# Patient Record
Sex: FEMALE | Race: WHITE
[De-identification: names, ages, dates, MRNs, and addresses within clinical notes are randomized per-mention and may not be internally consistent; named-entity substitution may affect disease eponyms.]

---

## 2017-06-12 ENCOUNTER — HOSPITAL ENCOUNTER (EMERGENCY)
Dept: HOSPITAL 45 - C.EDB | Age: 42
LOS: 1 days | Discharge: HOME | End: 2017-06-13
Payer: COMMERCIAL

## 2017-06-12 VITALS
WEIGHT: 148.37 LBS | BODY MASS INDEX: 22.49 KG/M2 | WEIGHT: 148.37 LBS | HEIGHT: 67.99 IN | HEIGHT: 67.99 IN | BODY MASS INDEX: 22.49 KG/M2

## 2017-06-12 VITALS — TEMPERATURE: 98.24 F

## 2017-06-12 DIAGNOSIS — Q44.6: ICD-10-CM

## 2017-06-12 DIAGNOSIS — N39.0: ICD-10-CM

## 2017-06-12 DIAGNOSIS — R10.31: Primary | ICD-10-CM

## 2017-06-12 DIAGNOSIS — Z91.018: ICD-10-CM

## 2017-06-12 NOTE — EMERGENCY ROOM VISIT NOTE
History


Report prepared by Theodora:  Layla Green


Under the Supervision of:  Dr. Pepe Mcintosh M.D.


First contact with patient:  23:50


Chief Complaint:  FLANK PAIN


Stated Complaint:  PAIN IN LWR RIGHT SIDE





History of Present Illness


The patient is a 41 year old female who presents to the Emergency Room with 

complaints of persistent right lower quadrant abdominal pain that started 

around 0800 this morning. She rates this pain a 6/10 in severity. This pain is 

worse with movement and palpation. Associated symptoms include urinary 

frequency. Patient denies fevers, chills, nausea, vomiting, vaginal discharge, 

diarrhea, chest pain, painful urination, or any additional associated symptoms. 

Patient denies a significant GI history.





   Source of History:  patient


   Onset:  0800 tihs morning


   Position:  abdomen (RLQ)


   Symptom Intensity:  6/10


   Timing:  other (Persistent )


   Modifying Factors (Worsening):  movement, other (Palpation)


   Associated Symptoms:  + urinary symptoms (frequency), No fevers, No chills, 

No nausea, No vomiting, No diarrhea





Review of Systems


See HPI for pertinent positives & negatives. A total of 10 systems reviewed and 

were otherwise negative.





Past Medical & Surgical


Medical Problems:


(1) No history of gastrointestinal disorder








Family History





FH: hypertension





Social History


Smoking Status:  Never Smoker


Smokeless Tobacco Use:  No


Alcohol Use:  none


Drug Use:  none


Occupation Status:  employed





Current/Historical Medications


Scheduled


Birth Control Pills (Birth Control Pills), 1 TAB PO DAILY


Cephalexin Monohydrate (Keflex), 500 MG PO TID





Allergies


Coded Allergies:  


     Grape (Verified  Allergy, Unknown, GRAPE FLAVORING, 6/13/17)





Physical Exam


Vital Signs











  Date Time  Temp Pulse Resp B/P (MAP) Pulse Ox O2 Delivery O2 Flow Rate FiO2


 


6/13/17 01:43  80 18 123/83 99   


 


6/13/17 01:26  80 18 123/83 99 Room Air  


 


6/12/17 23:46 36.8 78 18 129/84 100 Room Air  











Physical Exam


GENERAL: Patient is well appearing, in moderate discomfort. 


HEENT: No acute trauma, normocephalic atraumatic, mucous membranes moist, no 

nasal congestion, no scleral icterus.


NECK: No stridor, no adenopathy, no meningismus, trachea is midline.


LUNGS: No dyspnea. Clear to auscultation and equal bilaterally. No wheeze, no 

rhonchi.


HEART: Regular rate and rhythm.  No murmurs, rubs, gallops appreciated.


ABDOMEN: Moderate right lower quadrant tenderness to palpation. no guarding, 

questionable rebound, bowel sounds positive, no masses appreciated, no 

peritonitis.


BACK: No midline tenderness, no CVA tenderness


EXTREMITIES: Normal motion all extremities, no cyanosis, no edema.


NEUROLOGIC: Alert and oriented, no acute motor or sensory deficits, no focal 

weakness, cranial nerves grossly intact.


SKIN: No rash, no jaundice, no diaphoresis.





Medical Decision & Procedures


ER Provider


Diagnostic Interpretation:


CT results per my review and statrad interpretation:





CT ABDOMEN & PELVIS:





Hepatomegaly with innumerable fluid attenuation lesions throughout the liver 

suggesting polycystic liver disease. Gallbladder, spleen, pancreas, and adrenal 

glands are unremarkable. 





No evidence of hydronephrosis or hydroureter. Symmetric renal enhancement. 

Punctate calcification near the right UVJ (2-80), favor phlebolith over distal 

ureteral stone given lack of right hydroureteronephrosis; correlate for 

hematuria. 





No evidence of bowel obstruction. The appendix is not visualized. 





Urinary bladder and uterus are unremarkable.





No acute osseous findings.





Radiologist: Adam Ibarra M.D.





Laboratory Results


6/13/17 00:15








Red Blood Count 4.75, Mean Corpuscular Volume 89.5, Mean Corpuscular Hemoglobin 

28.6, Mean Corpuscular Hemoglobin Concent 32.0, Mean Platelet Volume 9.3, 

Neutrophils (%) (Auto) 44.0, Lymphocytes (%) (Auto) 46.4, Monocytes (%) (Auto) 

6.9, Eosinophils (%) (Auto) 2.2, Basophils (%) (Auto) 0.4, Neutrophils # (Auto) 

4.23, Lymphocytes # (Auto) 4.45, Monocytes # (Auto) 0.66, Eosinophils # (Auto) 

0.21, Basophils # (Auto) 0.04





6/13/17 00:15

















Test


  6/13/17


00:10 6/13/17


00:15 6/13/17


00:21


 


Urine Color YELLOW   


 


Urine Appearance CLEAR (CLEAR)   


 


Urine pH 5.5 (4.5-7.5)   


 


Urine Specific Gravity


  1.008


(1.000-1.030) 


  


 


 


Urine Protein NEG (NEG)   


 


Urine Glucose (UA) NEG (NEG)   


 


Urine Ketones NEG (NEG)   


 


Urine Occult Blood NEG (NEG)   


 


Urine Nitrite NEG (NEG)   


 


Urine Bilirubin NEG (NEG)   


 


Urine Urobilinogen NEG (NEG)   


 


Urine Leukocyte Esterase TRACE (NEG)   


 


Urine WBC (Auto) 1-5 /hpf (0-5)   


 


Urine RBC (Auto) 0-4 /hpf (0-4)   


 


Urine Hyaline Casts (Auto) 0 /lpf (0-5)   


 


Urine Epithelial Cells (Auto) >30 /lpf (0-5)   


 


Urine Bacteria (Auto) 1+ (NEG)   


 


Urine Pregnancy Test NEG (NEG)   


 


White Blood Count


  


  9.60 K/uL


(4.8-10.8) 


 


 


Red Blood Count


  


  4.75 M/uL


(4.2-5.4) 


 


 


Hemoglobin


  


  13.6 g/dL


(12.0-16.0) 


 


 


Hematocrit  42.5 % (37-47)  


 


Mean Corpuscular Volume


  


  89.5 fL


() 


 


 


Mean Corpuscular Hemoglobin


  


  28.6 pg


(25-34) 


 


 


Mean Corpuscular Hemoglobin


Concent 


  32.0 g/dl


(32-36) 


 


 


Platelet Count


  


  328 K/uL


(130-400) 


 


 


Mean Platelet Volume


  


  9.3 fL


(7.4-10.4) 


 


 


Neutrophils (%) (Auto)  44.0 %  


 


Lymphocytes (%) (Auto)  46.4 %  


 


Monocytes (%) (Auto)  6.9 %  


 


Eosinophils (%) (Auto)  2.2 %  


 


Basophils (%) (Auto)  0.4 %  


 


Neutrophils # (Auto)


  


  4.23 K/uL


(1.4-6.5) 


 


 


Lymphocytes # (Auto)


  


  4.45 K/uL


(1.2-3.4) 


 


 


Monocytes # (Auto)


  


  0.66 K/uL


(0.11-0.59) 


 


 


Eosinophils # (Auto)


  


  0.21 K/uL


(0-0.5) 


 


 


Basophils # (Auto)


  


  0.04 K/uL


(0-0.2) 


 


 


RDW Standard Deviation


  


  49.1 fL


(36.4-46.3) 


 


 


RDW Coefficient of Variation


  


  14.9 %


(11.5-14.5) 


 


 


Immature Granulocyte % (Auto)  0.1 %  


 


Immature Granulocyte # (Auto)


  


  0.01 K/uL


(0.00-0.02) 


 


 


Est Creatinine Clear Calc


Drug Dose 


  94.3 ml/min 


  


 


 


Estimated GFR (


American) 


  106.9 


  


 


 


Estimated GFR (Non-


American 


  92.2 


  


 


 


BUN/Creatinine Ratio  16.5 (10-20)  


 


Calcium Level


  


  9.5 mg/dl


(8.5-10.1) 


 


 


Total Bilirubin


  


  0.6 mg/dl


(0.2-1) 


 


 


Direct Bilirubin


  


  0.1 mg/dl


(0-0.2) 


 


 


Aspartate Amino Transf


(AST/SGOT) 


  19 U/L (15-37) 


  


 


 


Alanine Aminotransferase


(ALT/SGPT) 


  28 U/L (12-78) 


  


 


 


Alkaline Phosphatase


  


  66 U/L


() 


 


 


Total Protein


  


  8.8 gm/dl


(6.4-8.2) 


 


 


Albumin


  


  4.3 gm/dl


(3.4-5.0) 


 


 


Lipase


  


  196 U/L


() 


 


 


Bedside Hemoglobin


  


  


  15.6 g/dl


(12.0-16.0)


 


Bedside Hematocrit   46 % (37-47) 


 


Bedside Sodium


  


  


  142 mEq/L


(135-144)


 


Bedside Potassium


  


  


  3.7 mEq/L


(3.3-5.0)


 


Bedside Chloride


  


  


  100 mEq/L


(101-112)


 


Bedside Total CO2


  


  


  27 mEq/l


(24-31)


 


Anion Gap


  


  


  19.0 mmol/L


(16-25)


 


Bedside Blood Urea Nitrogen


  


  


  13 mg/dl


(7-18)


 


Bedside Creatinine


  


  


  0.7 mg/dl


(0.6-1.3)


 


Bedside Glucose (other)


  


  


  81 mg/dl


(70-99)


 


Bedside Ionized Calcium (Nelly)


  


  


  1.23 mmol/l


(1.12-1.32)





Laboratory results as reviewed by me.





Medications Administered











 Medications


  (Trade)  Dose


 Ordered  Sig/Mukesh


 Route  Start Time


 Stop Time Status Last Admin


Dose Admin


 


 Sodium Chloride  1,000 ml @ 


 999 mls/hr  Q1H1M STAT


 IV  6/12/17 23:55


 6/13/17 00:55 DC 6/13/17 00:16


999 MLS/HR


 


 Cephalexin


 Monohydrate


  (Keflex Cap)  500 mg  NOW  ONCE


 PO  6/13/17 01:45


 6/13/17 01:46 DC 6/13/17 01:41


500 MG











ED Course


2352: The patient was evaluated in room B4. A complete history and physical 

exam was performed.





2355: Ordered Sodium Chloride 1,000 ml @ 999 mls/hr IV. 





0105: Upon reevaluation, the patient is resting more comfortably at this time.





0145: Ordered Keflex Cap 500 mg PO. 





0148: Reevaluated the patient. Discussed results and discharge instructions: 

She verbalized understanding and agreement.   The patient is ready for 

discharge.





Medical Decision


Differential:  Appendicitis, , MSK, Diverticulitis, UTI, Renal Colic, Bowel 

Obstruction, Aortic Pathology, amongst other pathologies entertained.





Blood Pressure Screening: Patient was found to have a slightly elevated blood 

pressure due to circumstances. I do not believe that the patient requires 

hypertension monitoring. 





41 yr old female arrives with RLQ pain and moderate TTP 1 day after mowing.  No 

peritonitis nor evidence emergent surgical abdomen though with TTP felt that 

imaging reasonable.  CT without acute findings, though there is evidence of 

polycystic liver disease which she was unaware of.  Grandfather with history of 

Liver CA though he had alcoholic cirrhosis and no other family members with 

issue.  She has no evidence currently that this is metastatic disease.  She 

actually looks better with some IV fluids.  Will follow up with PCP regarding 

liver. UA mildly infected side with her frequency will treat at UTI. Will 

return if worsening or other concerns.  Discussed standard abdo pain monitoring.





Impression





 Primary Impression:  


 RLQ abdominal pain


 Additional Impressions:  


 Cystic disease of liver


 UTI (urinary tract infection)


 Polycystic liver disease





Scribe Attestation


The scribe's documentation has been prepared under my direction and personally 

reviewed by me in its entirety. I confirm that the note above accurately 

reflects all work, treatment, procedures, and medical decision making performed 

by me.





Departure Information


Dispostion


Home / Self-Care





Prescriptions





Cephalexin Monohydrate (Keflex) 500 Mg Cap


500 MG PO TID for 5 Days, #15 CAP


   Prov: Pepe Mcintosh M.D.         6/13/17





Referrals


No Doctor, Assigned (PCP)





Forms


HOME CARE DOCUMENTATION FORM,                                                 

               IMPORTANT VISIT INFORMATION





Patient Instructions


My [facility]





Additional Instructions





It is important you follow up with your primary care provider to discuss 

further evaluation of the many cysts seen on your CT scan of your abdomen.  

This is consistent with Polycystic Liver Disease and will need further testing.


Return if worsening pain, fevers, vomiting, passing out or other concerns.


Return in 24-48 hours if no improvement in pain.


Rest and keep well hydrated.  Use Tylenol and Motrin as needed for discomfort.





Problem Qualifiers

## 2017-06-13 VITALS — OXYGEN SATURATION: 99 % | DIASTOLIC BLOOD PRESSURE: 83 MMHG | SYSTOLIC BLOOD PRESSURE: 123 MMHG | HEART RATE: 80 BPM

## 2017-06-13 LAB
ALP SERPL-CCNC: 66 U/L (ref 45–117)
ALT SERPL-CCNC: 28 U/L (ref 12–78)
ANION GAP SERPL CALC-SCNC: 19 MMOL/L (ref 16–25)
ANION GAP SERPL CALC-SCNC: 9 MMOL/L (ref 3–11)
APPEARANCE UR: CLEAR
AST SERPL-CCNC: 19 U/L (ref 15–37)
BASOPHILS # BLD: 0.04 K/UL (ref 0–0.2)
BASOPHILS NFR BLD: 0.4 %
BILIRUB UR-MCNC: (no result) MG/DL
BUN SERPL-MCNC: 13 MG/DL (ref 7–18)
BUN/CREAT SERPL: 16.5 (ref 10–20)
CA-I BLD-SCNC: 1.23 MMOL/L (ref 1.12–1.32)
CALCIUM SERPL-MCNC: 9.5 MG/DL (ref 8.5–10.1)
CHLORIDE BLD-SCNC: 100 MEQ/L (ref 101–112)
CHLORIDE SERPL-SCNC: 105 MMOL/L (ref 98–107)
CO2 SERPL-SCNC: 30 MMOL/L (ref 21–32)
COLOR UR: YELLOW
COMPLETE: YES
CREAT BLD-MCNC: 0.7 MG/DL (ref 0.6–1.3)
CREAT CL PREDICTED SERPL C-G-VRATE: 94.3 ML/MIN
CREAT SERPL-MCNC: 0.8 MG/DL (ref 0.6–1.2)
EOSINOPHIL NFR BLD AUTO: 328 K/UL (ref 130–400)
GLUCOSE SERPL-MCNC: 78 MG/DL (ref 70–99)
HCT VFR BLD AUTO: 46 % (ref 37–47)
HCT VFR BLD CALC: 42.5 % (ref 37–47)
HGB BLD-MCNC: 15.6 G/DL (ref 12–16)
IG%: 0.1 %
IMM GRANULOCYTES NFR BLD AUTO: 46.4 %
ISTAT CARBON DIOXIDE: 27 MEQ/L (ref 24–31)
LYMPHOCYTES # BLD: 4.45 K/UL (ref 1.2–3.4)
MANUAL MICROSCOPIC REQUIRED?: NO
MCH RBC QN AUTO: 28.6 PG (ref 25–34)
MCHC RBC AUTO-ENTMCNC: 32 G/DL (ref 32–36)
MCV RBC AUTO: 89.5 FL (ref 80–100)
MONOCYTES NFR BLD: 6.9 %
NEUTROPHILS # BLD AUTO: 2.2 %
NEUTROPHILS NFR BLD AUTO: 44 %
NITRITE UR QL STRIP: (no result)
PH UR STRIP: 5.5 [PH] (ref 4.5–7.5)
PMV BLD AUTO: 9.3 FL (ref 7.4–10.4)
POTASSIUM SERPL-SCNC: 3.8 MMOL/L (ref 3.5–5.1)
RBC # BLD AUTO: 4.75 M/UL (ref 4.2–5.4)
REVIEW REQ?: NO
SODIUM BLD-SCNC: 142 MEQ/L (ref 135–144)
SODIUM SERPL-SCNC: 144 MMOL/L (ref 136–145)
SP GR UR STRIP: 1.01 (ref 1–1.03)
URINE EPITHELIAL CELL AUTO: >30 /LPF (ref 0–5)
UROBILINOGEN UR-MCNC: (no result) MG/DL
WBC # BLD AUTO: 9.6 K/UL (ref 4.8–10.8)
ZZUR CULT IF INDIC CLEAN CATCH: YES

## 2017-06-13 NOTE — DIAGNOSTIC IMAGING REPORT
ABDOMEN AND PELVIS CT WITH IV CONTRAST



CT DOSE: 310.97 mGy.cm



HISTORY: Pain. Nausea.  RLQ abdominal pain, TTP



TECHNIQUE: Multiaxial CT images of the abdomen and pelvis were performed

following the use of intravenous contrast.



COMPARISON STUDY: None.



FINDINGS: Lung bases are clear. Liver shows diffuse multicystic changes

throughout. Spleen is unremarkable. Kidneys negative for calcification or

hydronephrosis. The bowel pattern is nonobstructive. The appendix is not well

seen. There is no significant pericecal inflammatory change. Bowel pattern again

is nonobstructive. Bladder is midline. Uterus is partially retroflexed. There is

no significant free fluid within the pelvic cul-de-sac.



IMPRESSION: 



1. Diffuse multi cystic change of the liver.





2. Otherwise negative study.





3. Poor visibility of the appendix.







Electronically signed by:  Luis Osuna M.D.

6/13/2017 6:46 AM



Dictated Date/Time:  6/13/2017 6:42 AM

## 2018-02-28 ENCOUNTER — HOSPITAL ENCOUNTER (OUTPATIENT)
Dept: HOSPITAL 45 - C.MAMM | Age: 43
Discharge: HOME | End: 2018-02-28
Attending: OBSTETRICS & GYNECOLOGY
Payer: COMMERCIAL

## 2018-02-28 DIAGNOSIS — Z12.31: Primary | ICD-10-CM

## 2018-02-28 NOTE — MAMMOGRAPHY REPORT
BILATERAL DIGITAL SCREENING MAMMOGRAM TOMOSYNTHESIS WITH CAD: 2/28/2018

CLINICAL HISTORY: Routine screening.  Patient has no complaints.  





TECHNIQUE:  Breast tomosynthesis in addition to standard 2D mammography was performed. Current study 
was also evaluated with a Computer Aided Detection (CAD) system.  



COMPARISON: No prior exams were available for comparison.   



BREAST COMPOSITION:  The tissue of both breasts is heterogeneously dense, which may obscure small mas
ses.  



FINDINGS: There are a few benign rim calcifications in the left breast.  No suspicious mass, architec
tural distortion or cluster of suspicious microcalcifications is seen.  



IMPRESSION:  ACR BI-RADS CATEGORY 1: NEGATIVE

There is no mammographic evidence of malignancy. A 1 year screening mammogram is recommended.  The pa
tient will receive written notification of the results.  





Approximately 10% of breast cancers are not detected with mammography. A negative mammographic report
 should not delay biopsy if a clinically suggestive mass is present.



Gabrielle Candelaria M.D.          

ay/:2/28/2018 13:30:17  



Imaging Technologist: Ynes BARNETT(R)(IRENE), [facility]

letter sent: Normal 1/2  

BI-RADS Code: ACR BI-RADS Category 1: Negative

## 2025-07-05 ENCOUNTER — HOSPITAL ENCOUNTER (EMERGENCY)
Facility: HOSPITAL | Age: 50
Discharge: HOME | End: 2025-07-05
Attending: EMERGENCY MEDICINE | Admitting: EMERGENCY MEDICINE
Payer: COMMERCIAL

## 2025-07-05 ENCOUNTER — APPOINTMENT (EMERGENCY)
Dept: RADIOLOGY | Facility: HOSPITAL | Age: 50
End: 2025-07-05
Payer: COMMERCIAL

## 2025-07-05 VITALS
RESPIRATION RATE: 18 BRPM | SYSTOLIC BLOOD PRESSURE: 137 MMHG | HEART RATE: 80 BPM | DIASTOLIC BLOOD PRESSURE: 81 MMHG | OXYGEN SATURATION: 98 % | TEMPERATURE: 97.6 F | WEIGHT: 168.65 LBS

## 2025-07-05 DIAGNOSIS — R06.00 DYSPNEA, UNSPECIFIED TYPE: ICD-10-CM

## 2025-07-05 DIAGNOSIS — E86.0 DEHYDRATION: ICD-10-CM

## 2025-07-05 DIAGNOSIS — R00.2 PALPITATIONS: Primary | ICD-10-CM

## 2025-07-05 LAB
ALBUMIN SERPL BCG-MCNC: 4.3 G/DL (ref 3.5–5)
ALP SERPL-CCNC: 61 U/L (ref 34–104)
ALT SERPL W P-5'-P-CCNC: 12 U/L (ref 7–52)
ANION GAP SERPL CALCULATED.3IONS-SCNC: 7 MMOL/L (ref 4–13)
AST SERPL W P-5'-P-CCNC: 19 U/L (ref 13–39)
BASOPHILS # BLD AUTO: 0.03 THOUSANDS/ÂΜL (ref 0–0.1)
BASOPHILS NFR BLD AUTO: 1 % (ref 0–1)
BILIRUB SERPL-MCNC: 0.59 MG/DL (ref 0.2–1)
BNP SERPL-MCNC: 96 PG/ML (ref 0–100)
BUN SERPL-MCNC: 12 MG/DL (ref 5–25)
CALCIUM SERPL-MCNC: 9.7 MG/DL (ref 8.4–10.2)
CARDIAC TROPONIN I PNL SERPL HS: <2 NG/L (ref ?–50)
CHLORIDE SERPL-SCNC: 107 MMOL/L (ref 96–108)
CO2 SERPL-SCNC: 26 MMOL/L (ref 21–32)
CREAT SERPL-MCNC: 0.75 MG/DL (ref 0.6–1.3)
EOSINOPHIL # BLD AUTO: 0.13 THOUSAND/ÂΜL (ref 0–0.61)
EOSINOPHIL NFR BLD AUTO: 3 % (ref 0–6)
ERYTHROCYTE [DISTWIDTH] IN BLOOD BY AUTOMATED COUNT: 13 % (ref 11.6–15.1)
GFR SERPL CREATININE-BSD FRML MDRD: 93 ML/MIN/1.73SQ M
GLUCOSE SERPL-MCNC: 99 MG/DL (ref 65–140)
HCT VFR BLD AUTO: 41.8 % (ref 34.8–46.1)
HGB BLD-MCNC: 13.3 G/DL (ref 11.5–15.4)
IMM GRANULOCYTES # BLD AUTO: 0.01 THOUSAND/UL (ref 0–0.2)
IMM GRANULOCYTES NFR BLD AUTO: 0 % (ref 0–2)
LYMPHOCYTES # BLD AUTO: 1.64 THOUSANDS/ÂΜL (ref 0.6–4.47)
LYMPHOCYTES NFR BLD AUTO: 33 % (ref 14–44)
MAGNESIUM SERPL-MCNC: 2 MG/DL (ref 1.9–2.7)
MCH RBC QN AUTO: 29.9 PG (ref 26.8–34.3)
MCHC RBC AUTO-ENTMCNC: 31.8 G/DL (ref 31.4–37.4)
MCV RBC AUTO: 94 FL (ref 82–98)
MONOCYTES # BLD AUTO: 0.45 THOUSAND/ÂΜL (ref 0.17–1.22)
MONOCYTES NFR BLD AUTO: 9 % (ref 4–12)
NEUTROPHILS # BLD AUTO: 2.65 THOUSANDS/ÂΜL (ref 1.85–7.62)
NEUTS SEG NFR BLD AUTO: 54 % (ref 43–75)
NRBC BLD AUTO-RTO: 0 /100 WBCS
PLATELET # BLD AUTO: 248 THOUSANDS/UL (ref 149–390)
PMV BLD AUTO: 10 FL (ref 8.9–12.7)
POTASSIUM SERPL-SCNC: 4 MMOL/L (ref 3.5–5.3)
PROT SERPL-MCNC: 7.1 G/DL (ref 6.4–8.4)
RBC # BLD AUTO: 4.45 MILLION/UL (ref 3.81–5.12)
SODIUM SERPL-SCNC: 140 MMOL/L (ref 135–147)
TSH SERPL DL<=0.05 MIU/L-ACNC: 1.82 UIU/ML (ref 0.45–4.5)
WBC # BLD AUTO: 4.91 THOUSAND/UL (ref 4.31–10.16)

## 2025-07-05 PROCEDURE — 83880 ASSAY OF NATRIURETIC PEPTIDE: CPT | Performed by: EMERGENCY MEDICINE

## 2025-07-05 PROCEDURE — 99285 EMERGENCY DEPT VISIT HI MDM: CPT

## 2025-07-05 PROCEDURE — 96361 HYDRATE IV INFUSION ADD-ON: CPT

## 2025-07-05 PROCEDURE — 84443 ASSAY THYROID STIM HORMONE: CPT | Performed by: EMERGENCY MEDICINE

## 2025-07-05 PROCEDURE — 85025 COMPLETE CBC W/AUTO DIFF WBC: CPT | Performed by: EMERGENCY MEDICINE

## 2025-07-05 PROCEDURE — 96374 THER/PROPH/DIAG INJ IV PUSH: CPT

## 2025-07-05 PROCEDURE — 80053 COMPREHEN METABOLIC PANEL: CPT | Performed by: EMERGENCY MEDICINE

## 2025-07-05 PROCEDURE — 36415 COLL VENOUS BLD VENIPUNCTURE: CPT | Performed by: EMERGENCY MEDICINE

## 2025-07-05 PROCEDURE — 83735 ASSAY OF MAGNESIUM: CPT | Performed by: EMERGENCY MEDICINE

## 2025-07-05 PROCEDURE — 84484 ASSAY OF TROPONIN QUANT: CPT | Performed by: EMERGENCY MEDICINE

## 2025-07-05 PROCEDURE — 93005 ELECTROCARDIOGRAM TRACING: CPT

## 2025-07-05 PROCEDURE — 99285 EMERGENCY DEPT VISIT HI MDM: CPT | Performed by: EMERGENCY MEDICINE

## 2025-07-05 PROCEDURE — 71046 X-RAY EXAM CHEST 2 VIEWS: CPT

## 2025-07-05 RX ORDER — FAMOTIDINE 10 MG/ML
20 INJECTION, SOLUTION INTRAVENOUS ONCE
Status: COMPLETED | OUTPATIENT
Start: 2025-07-05 | End: 2025-07-05

## 2025-07-05 RX ADMIN — SODIUM CHLORIDE 1000 ML: 0.9 INJECTION, SOLUTION INTRAVENOUS at 16:23

## 2025-07-05 RX ADMIN — FAMOTIDINE 20 MG: 10 INJECTION, SOLUTION INTRAVENOUS at 16:23

## 2025-07-05 NOTE — ED PROVIDER NOTES
Time reflects when diagnosis was documented in both MDM as applicable and the Disposition within this note       Time User Action Codes Description Comment    7/5/2025  6:20 PM Rodo Talley [R00.2] Palpitations     7/5/2025  6:21 PM Rodo Talley [R06.00] Dyspnea, unspecified type     7/5/2025  6:21 PM Rodo Talley [E86.0] Dehydration           ED Disposition       ED Disposition   Discharge    Condition   Stable    Date/Time   Sat Jul 5, 2025  6:20 PM    Comment   Steffi Thompson discharge to home/self care.                   Assessment & Plan       Medical Decision Making  Patient was seen and evaluated for her presentation as outlined.  At risk for acute coronary syndrome, cardiac dysrhythmia, pulmonary abnormality, dehydration, organ dysfunction, electrolyte disturbance, other.  Normal sinus rhythm on EKG.  Stable during ED course.  Vital signs within normal limits.  Given Pepcid and normal saline in the ED with improvement in symptoms.  Suspect possible flareup of GERD type symptoms triggered by mild dehydration and caffeine use.  No concerning laboratory abnormalities.  Doubt cardiac etiology.  Reassurance provided to patient.  Stable for discharge from the emergency department.  Advised continuation of omeprazole, consideration of supplementation with over-the-counter Pepcid.  Advised close primary care follow-up.  Strict return precautions reviewed.  All questions answered.    Amount and/or Complexity of Data Reviewed  Labs: ordered.  Radiology: ordered and independent interpretation performed.  ECG/medicine tests: ordered and independent interpretation performed.     Details: EKG per my interpretation shows normal sinus rhythm at a ventricular rate of 74 bpm.  Normal axis, normal intervals.  No acute ST elevations or T wave inversions.  No prior EKGs available for comparison.    Risk  Prescription drug management.             Medications   sodium chloride 0.9 % bolus 1,000 mL (0 mL  Intravenous Stopped 7/5/25 1729)   Famotidine (PF) (PEPCID) injection 20 mg (20 mg Intravenous Given 7/5/25 1623)       ED Risk Strat Scores                    No data recorded        SBIRT 20yo+      Flowsheet Row Most Recent Value   Initial Alcohol Screen: US AUDIT-C     1. How often do you have a drink containing alcohol? 0 Filed at: 07/05/2025 1557   2. How many drinks containing alcohol do you have on a typical day you are drinking?  0 Filed at: 07/05/2025 1556   3b. FEMALE Any Age, or MALE 65+: How often do you have 4 or more drinks on one occassion? 0 Filed at: 07/05/2025 1556   Audit-C Score 0 Filed at: 07/05/2025 1556   RAYMUNDO: How many times in the past year have you...    Used an illegal drug or used a prescription medication for non-medical reasons? Never Filed at: 07/05/2025 1556                            History of Present Illness       Chief Complaint   Patient presents with    Shortness of Breath     C/o SOB that started approximately 1 hour ago; admits to increased caffeine intake today. Denies hx of anxiety/panic attacks. Denies hx of asthma, COPD, etc.        Past Medical History[1]   Past Surgical History[2]   Family History[3]   Social History[4]   E-Cigarette/Vaping    E-Cigarette Use Never User       E-Cigarette/Vaping Substances      I have reviewed and agree with the history as documented.     Patient presents to the emergency department accompanied by family for evaluation of shortness of breath, sensation of palpitations that started about an hour ago.  Patient denies any chest pain, lightheadedness, or dizziness.  Denies prior similar symptoms.  Does have history of reflux, takes omeprazole.  Does admit to drinking more caffeine than usual, including coffee earlier and then some iced tea.  Also admits that she may be dehydrated, admits to not drinking much fluid today.  No nausea, vomiting, or diarrhea.  No other complaints, modifying factors, or associated symptoms.        Review of  Systems   All other systems reviewed and are negative.          Objective       ED Triage Vitals [07/05/25 1555]   Temperature Pulse Blood Pressure Respirations SpO2 Patient Position - Orthostatic VS   97.6 °F (36.4 °C) 80 137/81 18 98 % Sitting      Temp Source Heart Rate Source BP Location FiO2 (%) Pain Score    Temporal Monitor Left arm -- No Pain      Vitals      Date and Time Temp Pulse SpO2 Resp BP Pain Score FACES Pain Rating User   07/05/25 1555 97.6 °F (36.4 °C) 80 98 % 18 137/81 No Pain -- KM            Physical Exam  Vitals and nursing note reviewed.   Constitutional:       General: She is not in acute distress.     Appearance: She is well-developed. She is not ill-appearing.   HENT:      Head: Normocephalic and atraumatic.     Eyes:      Conjunctiva/sclera: Conjunctivae normal.       Cardiovascular:      Rate and Rhythm: Normal rate and regular rhythm.      Heart sounds: No murmur heard.     No friction rub. No gallop.   Pulmonary:      Effort: Pulmonary effort is normal. No respiratory distress.      Breath sounds: Normal breath sounds. No wheezing, rhonchi or rales.   Abdominal:      Palpations: Abdomen is soft.      Tenderness: There is no abdominal tenderness.     Musculoskeletal:         General: No swelling. Normal range of motion.      Cervical back: Normal range of motion and neck supple.     Skin:     General: Skin is warm and dry.      Capillary Refill: Capillary refill takes less than 2 seconds.     Neurological:      General: No focal deficit present.      Mental Status: She is alert and oriented to person, place, and time.     Psychiatric:         Mood and Affect: Mood normal.         Behavior: Behavior normal.         Results Reviewed       Procedure Component Value Units Date/Time    TSH, 3rd generation with Free T4 reflex [[id number]]  (Normal) Collected: 07/05/25 1623    Lab Status: Final result Specimen: Blood from Arm, Right Updated: 07/05/25 1703     TSH 3RD GENERATION 1.825 uIU/mL      HS Troponin 0hr (reflex protocol) [[id number]]  (Normal) Collected: 07/05/25 1623    Lab Status: Final result Specimen: Blood from Arm, Right Updated: 07/05/25 1656     hs TnI 0hr <2 ng/L     B-Type Natriuretic Peptide(BNP) [[id number]]  (Normal) Collected: 07/05/25 1623    Lab Status: Final result Specimen: Blood from Arm, Right Updated: 07/05/25 1654     BNP 96 pg/mL     Comprehensive metabolic panel [[id number]] Collected: 07/05/25 1623    Lab Status: Final result Specimen: Blood from Arm, Right Updated: 07/05/25 1651     Sodium 140 mmol/L      Potassium 4.0 mmol/L      Chloride 107 mmol/L      CO2 26 mmol/L      ANION GAP 7 mmol/L      BUN 12 mg/dL      Creatinine 0.75 mg/dL      Glucose 99 mg/dL      Calcium 9.7 mg/dL      AST 19 U/L      ALT 12 U/L      Alkaline Phosphatase 61 U/L      Total Protein 7.1 g/dL      Albumin 4.3 g/dL      Total Bilirubin 0.59 mg/dL      eGFR 93 ml/min/1.73sq m     Narrative:      National Kidney Disease Foundation guidelines for Chronic Kidney Disease (CKD):     Stage 1 with normal or high GFR (GFR > 90 mL/min/1.73 square meters)    Stage 2 Mild CKD (GFR = 60-89 mL/min/1.73 square meters)    Stage 3A Moderate CKD (GFR = 45-59 mL/min/1.73 square meters)    Stage 3B Moderate CKD (GFR = 30-44 mL/min/1.73 square meters)    Stage 4 Severe CKD (GFR = 15-29 mL/min/1.73 square meters)    Stage 5 End Stage CKD (GFR <15 mL/min/1.73 square meters)  Note: GFR calculation is accurate only with a steady state creatinine    Magnesium [[id number]]  (Normal) Collected: 07/05/25 1623    Lab Status: Final result Specimen: Blood from Arm, Right Updated: 07/05/25 1651     Magnesium 2.0 mg/dL     CBC and differential [[id number]] Collected: 07/05/25 1623    Lab Status: Final result Specimen: Blood from Arm, Right Updated: 07/05/25 1632     WBC 4.91 Thousand/uL      RBC 4.45 Million/uL      Hemoglobin 13.3 g/dL      Hematocrit 41.8 %      MCV 94 fL      MCH 29.9 pg      MCHC 31.8 g/dL      RDW 13.0 %       MPV 10.0 fL      Platelets 248 Thousands/uL      nRBC 0 /100 WBCs      Segmented % 54 %      Immature Grans % 0 %      Lymphocytes % 33 %      Monocytes % 9 %      Eosinophils Relative 3 %      Basophils Relative 1 %      Absolute Neutrophils 2.65 Thousands/µL      Absolute Immature Grans 0.01 Thousand/uL      Absolute Lymphocytes 1.64 Thousands/µL      Absolute Monocytes 0.45 Thousand/µL      Eosinophils Absolute 0.13 Thousand/µL      Basophils Absolute 0.03 Thousands/µL             XR chest 2 views   ED Interpretation by Rodo Talley MD (07/05 1815)   No acute cardiopulmonary disease appreciated.          Procedures    ED Medication and Procedure Management   None     Patient's Medications    No medications on file     No discharge procedures on file.  ED SEPSIS DOCUMENTATION   Time reflects when diagnosis was documented in both MDM as applicable and the Disposition within this note       Time User Action Codes Description Comment    7/5/2025  6:20 PM Rodo Talley Add [R00.2] Palpitations     7/5/2025  6:21 PM Rodo Talley [R06.00] Dyspnea, unspecified type     7/5/2025  6:21 PM Rodo Talley [E86.0] Dehydration                      [1] No past medical history on file.  [2] No past surgical history on file.  [3] No family history on file.  [4]   Social History  Tobacco Use    Smoking status: Never    Smokeless tobacco: Never   Vaping Use    Vaping status: Never Used   Substance Use Topics    Drug use: Never        Rodo Talley MD  07/05/25 3000

## 2025-07-05 NOTE — DISCHARGE INSTRUCTIONS
Continue omeprazole.  Consider supplementing with over-the-counter Pepcid.  Stay well-hydrated.  Follow-up with primary care for reassessment of symptoms and consideration of further management.  Return to the ED for any new or worsening symptoms or concerns.

## 2025-07-06 LAB
ATRIAL RATE: 74 BPM
P AXIS: 39 DEGREES
PR INTERVAL: 128 MS
QRS AXIS: 63 DEGREES
QRSD INTERVAL: 80 MS
QT INTERVAL: 390 MS
QTC INTERVAL: 432 MS
T WAVE AXIS: 18 DEGREES
VENTRICULAR RATE: 74 BPM